# Patient Record
Sex: FEMALE | Race: WHITE | Employment: FULL TIME | URBAN - METROPOLITAN AREA
[De-identification: names, ages, dates, MRNs, and addresses within clinical notes are randomized per-mention and may not be internally consistent; named-entity substitution may affect disease eponyms.]

---

## 2022-08-03 ENCOUNTER — TELEPHONE (OUTPATIENT)
Dept: ADMINISTRATIVE | Age: 52
End: 2022-08-03

## 2022-08-03 NOTE — TELEPHONE ENCOUNTER
Patient is calling because her on going pain with her left big toe has show zero improvement and feel like is getting worst. She mentioned that she was already given antibiotics but that has done nothing and is starting to get more worried, Please call patient back to advise

## 2022-08-05 NOTE — TELEPHONE ENCOUNTER
I received her message last week and recommended she go to after hours b/c of the time of day. Did she go? Please triage, needs to be seen regardless.  Make sure you get back to me today on this

## 2022-08-05 NOTE — TELEPHONE ENCOUNTER
I spoke with pt and she is doing fine. We referred her to Dr Jihan Bell podiatrist whom she saw earlier this week. She has an ingrown toenail, had XR which was neg and no infection. Has planned resection of nail in several weeks. She wanted me to be aware.